# Patient Record
Sex: FEMALE | Race: WHITE | Employment: FULL TIME | URBAN - METROPOLITAN AREA
[De-identification: names, ages, dates, MRNs, and addresses within clinical notes are randomized per-mention and may not be internally consistent; named-entity substitution may affect disease eponyms.]

---

## 2023-12-04 ENCOUNTER — OFFICE VISIT (OUTPATIENT)
Dept: PRIMARY CARE CLINIC | Age: 48
End: 2023-12-04

## 2023-12-04 VITALS
WEIGHT: 190 LBS | BODY MASS INDEX: 35.87 KG/M2 | OXYGEN SATURATION: 98 % | DIASTOLIC BLOOD PRESSURE: 72 MMHG | SYSTOLIC BLOOD PRESSURE: 128 MMHG | HEIGHT: 61 IN | HEART RATE: 81 BPM

## 2023-12-04 DIAGNOSIS — Z13.220 LIPID SCREENING: ICD-10-CM

## 2023-12-04 DIAGNOSIS — Z13.21 ENCOUNTER FOR VITAMIN DEFICIENCY SCREENING: ICD-10-CM

## 2023-12-04 DIAGNOSIS — M79.601 PARESTHESIA AND PAIN OF BOTH UPPER EXTREMITIES: ICD-10-CM

## 2023-12-04 DIAGNOSIS — M54.50 CHRONIC MIDLINE LOW BACK PAIN WITHOUT SCIATICA: ICD-10-CM

## 2023-12-04 DIAGNOSIS — R20.2 PARESTHESIA OF BOTH LOWER EXTREMITIES: ICD-10-CM

## 2023-12-04 DIAGNOSIS — M79.602 PARESTHESIA AND PAIN OF BOTH UPPER EXTREMITIES: ICD-10-CM

## 2023-12-04 DIAGNOSIS — M54.6 CHRONIC BILATERAL THORACIC BACK PAIN: Primary | ICD-10-CM

## 2023-12-04 DIAGNOSIS — R20.2 PARESTHESIA AND PAIN OF BOTH UPPER EXTREMITIES: ICD-10-CM

## 2023-12-04 DIAGNOSIS — Z00.00 PERIODIC HEALTH ASSESSMENT, GENERAL SCREENING, ADULT: ICD-10-CM

## 2023-12-04 DIAGNOSIS — Z13.1 DIABETES MELLITUS SCREENING: ICD-10-CM

## 2023-12-04 DIAGNOSIS — Z13.0 SCREENING FOR DEFICIENCY ANEMIA: ICD-10-CM

## 2023-12-04 DIAGNOSIS — G89.29 CHRONIC BILATERAL THORACIC BACK PAIN: Primary | ICD-10-CM

## 2023-12-04 DIAGNOSIS — G89.29 CHRONIC MIDLINE LOW BACK PAIN WITHOUT SCIATICA: ICD-10-CM

## 2023-12-04 DIAGNOSIS — Z13.29 THYROID DISORDER SCREENING: ICD-10-CM

## 2023-12-04 PROCEDURE — 99204 OFFICE O/P NEW MOD 45 MIN: CPT | Performed by: NURSE PRACTITIONER

## 2023-12-04 RX ORDER — CYCLOBENZAPRINE HCL 5 MG
5 TABLET ORAL 3 TIMES DAILY PRN
Qty: 30 TABLET | Refills: 0 | Status: SHIPPED | OUTPATIENT
Start: 2023-12-04 | End: 2023-12-04

## 2023-12-04 RX ORDER — CYCLOBENZAPRINE HCL 5 MG
5 TABLET ORAL 3 TIMES DAILY PRN
Qty: 30 TABLET | Refills: 0 | Status: SHIPPED | OUTPATIENT
Start: 2023-12-04 | End: 2023-12-14

## 2023-12-04 RX ORDER — IBUPROFEN 800 MG/1
800 TABLET ORAL
Qty: 90 TABLET | Refills: 1 | Status: SHIPPED | OUTPATIENT
Start: 2023-12-04 | End: 2024-02-02

## 2023-12-04 RX ORDER — METHYLPREDNISOLONE 4 MG/1
TABLET ORAL
Qty: 1 KIT | Refills: 0 | Status: SHIPPED | OUTPATIENT
Start: 2023-12-04 | End: 2023-12-10

## 2023-12-04 RX ORDER — IBUPROFEN 200 MG
400 TABLET ORAL DAILY PRN
COMMUNITY
End: 2023-12-04 | Stop reason: DRUGHIGH

## 2023-12-04 RX ORDER — LIDOCAINE 50 MG/G
PATCH TOPICAL
Qty: 30 PATCH | Refills: 1 | Status: SHIPPED | OUTPATIENT
Start: 2023-12-04

## 2023-12-04 RX ORDER — IBUPROFEN 200 MG
800 TABLET ORAL EVERY 8 HOURS PRN
Status: SHIPPED | COMMUNITY
Start: 2023-12-04 | End: 2023-12-04

## 2023-12-04 NOTE — PROGRESS NOTES
12/4/2023    Chief Complaint   Patient presents with    New Patient     Session PAKX:[id number]   :  Marcelo Arriola ( Hebrew)    Back Pain    Numbness     Hand and feet at night        Kai Carrasquillo is a 49 y.o. female, presents today to establish care. The patient has concern for back pain, that radiates to right side of abdomen, and paresthesia of bilateral upper and lower extremities. Patient states she has not seen a provider since cholecystectomy over 10 years ago. Requests screening labs. HPI  Back Pain  This is a chronic problem. Episode onset: \"several years ago\" The problem occurs constantly. The problem has been gradually worsening since onset. The pain is present in the thoracic spine. Radiates to: radiates to right side of abdomen. The pain is severe. The pain is The same all the time. The symptoms are aggravated by lying down and sitting. Stiffness is present All day. Associated symptoms include abdominal pain (occassionally right abdomen), numbness, paresthesias (bilateral lower extremitites) and tingling (bilateral upper and lower extremities). Pertinent negatives include no bladder incontinence, bowel incontinence, chest pain, dysuria, headaches, pelvic pain, perianal numbness, weakness or weight loss. Risk factors include sedentary lifestyle. She has tried NSAIDs (Advil 400 mg daily as needed for pain) for the symptoms. The treatment provided mild relief. Neuropathy    It is located in the LLE, LUE, RLE and RUE region. The distribution is symmetrical. The patient experiences pain during sleep, intermittently, on awakening and in the morning. Duration: started a \"couple months ago\" Quality of pain: tingling, burning and numbing (3 fingers on right hand \"feels more numb\" that is constant). \"Opens and closes right hand to get rid of numbness\". Review of Systems   Constitutional:  Negative for weight loss. Cardiovascular:  Negative for chest pain.    Gastrointestinal:

## 2023-12-07 DIAGNOSIS — Z00.00 PERIODIC HEALTH ASSESSMENT, GENERAL SCREENING, ADULT: ICD-10-CM

## 2023-12-07 DIAGNOSIS — Z13.29 THYROID DISORDER SCREENING: ICD-10-CM

## 2023-12-07 DIAGNOSIS — Z13.220 LIPID SCREENING: ICD-10-CM

## 2023-12-07 DIAGNOSIS — Z13.21 ENCOUNTER FOR VITAMIN DEFICIENCY SCREENING: ICD-10-CM

## 2023-12-07 DIAGNOSIS — Z13.0 SCREENING FOR DEFICIENCY ANEMIA: ICD-10-CM

## 2023-12-07 DIAGNOSIS — Z13.1 DIABETES MELLITUS SCREENING: ICD-10-CM

## 2023-12-07 LAB
25(OH)D3 SERPL-MCNC: 19.4 NG/ML
ALBUMIN SERPL-MCNC: 4.2 G/DL (ref 3.4–5)
ALBUMIN/GLOB SERPL: 1.4 {RATIO} (ref 1.1–2.2)
ALP SERPL-CCNC: 90 U/L (ref 40–129)
ALT SERPL-CCNC: 10 U/L (ref 10–40)
ANION GAP SERPL CALCULATED.3IONS-SCNC: 11 MMOL/L (ref 3–16)
AST SERPL-CCNC: 15 U/L (ref 15–37)
BASOPHILS # BLD: 0.1 K/UL (ref 0–0.2)
BASOPHILS NFR BLD: 0.8 %
BILIRUB SERPL-MCNC: <0.2 MG/DL (ref 0–1)
BUN SERPL-MCNC: 10 MG/DL (ref 7–20)
CALCIUM SERPL-MCNC: 9 MG/DL (ref 8.3–10.6)
CHLORIDE SERPL-SCNC: 104 MMOL/L (ref 99–110)
CHOLEST SERPL-MCNC: 192 MG/DL (ref 0–199)
CO2 SERPL-SCNC: 28 MMOL/L (ref 21–32)
CREAT SERPL-MCNC: 0.6 MG/DL (ref 0.6–1.1)
DEPRECATED RDW RBC AUTO: 16.6 % (ref 12.4–15.4)
EOSINOPHIL # BLD: 0 K/UL (ref 0–0.6)
EOSINOPHIL NFR BLD: 0.1 %
GFR SERPLBLD CREATININE-BSD FMLA CKD-EPI: >60 ML/MIN/{1.73_M2}
GLUCOSE P FAST SERPL-MCNC: 81 MG/DL (ref 70–99)
HCT VFR BLD AUTO: 33.2 % (ref 36–48)
HDLC SERPL-MCNC: 51 MG/DL (ref 40–60)
HGB BLD-MCNC: 10.7 G/DL (ref 12–16)
LDL CHOLESTEROL CALCULATED: 126 MG/DL
LYMPHOCYTES # BLD: 2.4 K/UL (ref 1–5.1)
LYMPHOCYTES NFR BLD: 23.3 %
MCH RBC QN AUTO: 25.3 PG (ref 26–34)
MCHC RBC AUTO-ENTMCNC: 32.2 G/DL (ref 31–36)
MCV RBC AUTO: 78.7 FL (ref 80–100)
MONOCYTES # BLD: 0.6 K/UL (ref 0–1.3)
MONOCYTES NFR BLD: 6.1 %
NEUTROPHILS # BLD: 7.1 K/UL (ref 1.7–7.7)
NEUTROPHILS NFR BLD: 69.7 %
PLATELET # BLD AUTO: 325 K/UL (ref 135–450)
PMV BLD AUTO: 8.6 FL (ref 5–10.5)
POTASSIUM SERPL-SCNC: 4.1 MMOL/L (ref 3.5–5.1)
PROT SERPL-MCNC: 7.3 G/DL (ref 6.4–8.2)
RBC # BLD AUTO: 4.23 M/UL (ref 4–5.2)
SODIUM SERPL-SCNC: 143 MMOL/L (ref 136–145)
TRIGL SERPL-MCNC: 76 MG/DL (ref 0–150)
TSH SERPL DL<=0.005 MIU/L-ACNC: 2.78 UIU/ML (ref 0.27–4.2)
VLDLC SERPL CALC-MCNC: 15 MG/DL
WBC # BLD AUTO: 10.1 K/UL (ref 4–11)

## 2023-12-08 LAB
EST. AVERAGE GLUCOSE BLD GHB EST-MCNC: 128.4 MG/DL
HBA1C MFR BLD: 6.1 %

## 2024-01-08 ENCOUNTER — OFFICE VISIT (OUTPATIENT)
Dept: PRIMARY CARE CLINIC | Age: 49
End: 2024-01-08

## 2024-01-08 VITALS
DIASTOLIC BLOOD PRESSURE: 74 MMHG | BODY MASS INDEX: 34.2 KG/M2 | SYSTOLIC BLOOD PRESSURE: 122 MMHG | OXYGEN SATURATION: 98 % | HEIGHT: 63 IN | HEART RATE: 76 BPM | WEIGHT: 193 LBS

## 2024-01-08 DIAGNOSIS — D64.9 ANEMIA, UNSPECIFIED TYPE: ICD-10-CM

## 2024-01-08 DIAGNOSIS — N92.0 MENORRHAGIA WITH REGULAR CYCLE: ICD-10-CM

## 2024-01-08 DIAGNOSIS — E78.00 ELEVATED LDL CHOLESTEROL LEVEL: ICD-10-CM

## 2024-01-08 DIAGNOSIS — E66.09 CLASS 1 OBESITY DUE TO EXCESS CALORIES WITH SERIOUS COMORBIDITY AND BODY MASS INDEX (BMI) OF 34.0 TO 34.9 IN ADULT: ICD-10-CM

## 2024-01-08 DIAGNOSIS — M54.6 CHRONIC BILATERAL THORACIC BACK PAIN: Primary | ICD-10-CM

## 2024-01-08 DIAGNOSIS — R73.03 PREDIABETES: ICD-10-CM

## 2024-01-08 DIAGNOSIS — E55.9 VITAMIN D DEFICIENCY: ICD-10-CM

## 2024-01-08 DIAGNOSIS — G89.29 CHRONIC BILATERAL THORACIC BACK PAIN: Primary | ICD-10-CM

## 2024-01-08 PROCEDURE — 99214 OFFICE O/P EST MOD 30 MIN: CPT | Performed by: NURSE PRACTITIONER

## 2024-01-08 RX ORDER — ERGOCALCIFEROL 1.25 MG/1
50000 CAPSULE ORAL WEEKLY
Qty: 12 CAPSULE | Refills: 1 | Status: SHIPPED | OUTPATIENT
Start: 2024-01-08 | End: 2024-01-12 | Stop reason: SDUPTHER

## 2024-01-08 RX ORDER — LIDOCAINE 50 MG/G
PATCH TOPICAL
Qty: 30 PATCH | Refills: 2 | Status: SHIPPED | OUTPATIENT
Start: 2024-01-08 | End: 2024-01-12 | Stop reason: SDUPTHER

## 2024-01-08 RX ORDER — CYCLOBENZAPRINE HCL 5 MG
5 TABLET ORAL 3 TIMES DAILY PRN
Qty: 30 TABLET | Refills: 0 | Status: SHIPPED | OUTPATIENT
Start: 2024-01-08 | End: 2024-01-12 | Stop reason: SDUPTHER

## 2024-01-08 RX ORDER — IBUPROFEN 800 MG/1
800 TABLET ORAL 3 TIMES DAILY PRN
Qty: 90 TABLET | Refills: 2 | Status: SHIPPED | OUTPATIENT
Start: 2024-01-08 | End: 2024-04-07

## 2024-01-08 ASSESSMENT — PATIENT HEALTH QUESTIONNAIRE - PHQ9
SUM OF ALL RESPONSES TO PHQ QUESTIONS 1-9: 0
1. LITTLE INTEREST OR PLEASURE IN DOING THINGS: 0
2. FEELING DOWN, DEPRESSED OR HOPELESS: 0
SUM OF ALL RESPONSES TO PHQ QUESTIONS 1-9: 0
SUM OF ALL RESPONSES TO PHQ9 QUESTIONS 1 & 2: 0

## 2024-01-08 ASSESSMENT — ENCOUNTER SYMPTOMS
BOWEL INCONTINENCE: 0
GASTROINTESTINAL NEGATIVE: 1

## 2024-01-08 NOTE — PROGRESS NOTES
1/8/2024    Chief Complaint   Patient presents with    1 Month Follow-Up     4 week follow up for back pain, paresthesia, and review labs    Session Code:70808 (Indonesian )       Zahra Gallegos is a 48 y.o. female, presents today 1 month follow up of back pain and review lab results.     utilized throughout visit.  Patient is accompanied by her friend, Etelvina today.      HPI  Back Pain  This is a chronic problem. Episode onset: \"several years ago\" The problem occurs constantly. The problem has been gradually worsening since onset. The pain is present in the thoracic spine. Radiates to: radiates to right side of abdomen. The pain is severe. The pain is The same all the time. The symptoms are aggravated by lying down and sitting. Stiffness is present All day. Pertinent negatives include no bladder incontinence, bowel incontinence, chest pain, dysuria, headaches, leg pain, numbness, paresthesias, pelvic pain, perianal numbness, tingling, weakness or weight loss. Risk factors include sedentary lifestyle. She has tried NSAIDs (medrol dosepack, muscle relaxers, lidocaine patches) for the symptoms. The treatment provided moderate relief.     Requests refill of lidocaine patches and muscle relaxer.    Patient to schedule an appointment with Kinga Laws PA-C with [facility].       Neuropathy  It is located in the LLE, LUE, RLE and RUE region. The distribution is symmetrical. The patient experiences pain during sleep, intermittently, on awakening and in the morning. Duration: started a \"couple months ago\" Quality of pain: tingling, burning and numbing (3 fingers on right hand \"feels more numb\" that is constant).   \"Opens and closes right hand to get rid of numbness\".      We discussed EMG procedures, however patient is unable to afford due to lack of insurance.  Will continue to monitor        Review of lab results  The available labs reviewed and analyzed and independent interpretation of the

## 2024-01-09 PROBLEM — E55.9 VITAMIN D DEFICIENCY: Status: ACTIVE | Noted: 2024-01-09

## 2024-01-09 PROBLEM — D64.9 ANEMIA: Status: ACTIVE | Noted: 2024-01-09

## 2024-01-09 PROBLEM — N92.0 MENORRHAGIA WITH REGULAR CYCLE: Status: ACTIVE | Noted: 2024-01-09

## 2024-01-09 PROBLEM — G89.29 CHRONIC BILATERAL THORACIC BACK PAIN: Status: ACTIVE | Noted: 2024-01-09

## 2024-01-09 PROBLEM — E78.00 ELEVATED LDL CHOLESTEROL LEVEL: Status: ACTIVE | Noted: 2024-01-09

## 2024-01-09 PROBLEM — R73.03 PREDIABETES: Status: ACTIVE | Noted: 2024-01-09

## 2024-01-09 PROBLEM — E66.09 CLASS 1 OBESITY DUE TO EXCESS CALORIES WITH SERIOUS COMORBIDITY AND BODY MASS INDEX (BMI) OF 34.0 TO 34.9 IN ADULT: Status: ACTIVE | Noted: 2024-01-09

## 2024-01-09 PROBLEM — M54.6 CHRONIC BILATERAL THORACIC BACK PAIN: Status: ACTIVE | Noted: 2024-01-09

## 2024-01-09 ASSESSMENT — ENCOUNTER SYMPTOMS
WHEEZING: 0
SHORTNESS OF BREATH: 0
CHEST TIGHTNESS: 0
BACK PAIN: 1

## 2024-01-12 DIAGNOSIS — E55.9 VITAMIN D DEFICIENCY: ICD-10-CM

## 2024-01-12 DIAGNOSIS — M54.50 CHRONIC MIDLINE LOW BACK PAIN WITHOUT SCIATICA: ICD-10-CM

## 2024-01-12 DIAGNOSIS — M54.6 CHRONIC BILATERAL THORACIC BACK PAIN: ICD-10-CM

## 2024-01-12 DIAGNOSIS — G89.29 CHRONIC MIDLINE LOW BACK PAIN WITHOUT SCIATICA: ICD-10-CM

## 2024-01-12 DIAGNOSIS — G89.29 CHRONIC BILATERAL THORACIC BACK PAIN: ICD-10-CM

## 2024-01-12 RX ORDER — CYCLOBENZAPRINE HCL 5 MG
5 TABLET ORAL 3 TIMES DAILY PRN
Qty: 30 TABLET | Refills: 0 | Status: SHIPPED | OUTPATIENT
Start: 2024-01-12 | End: 2024-01-22

## 2024-01-12 RX ORDER — IBUPROFEN 800 MG/1
800 TABLET ORAL
Qty: 90 TABLET | Refills: 1 | Status: SHIPPED | OUTPATIENT
Start: 2024-01-12 | End: 2024-03-12

## 2024-01-12 RX ORDER — ERGOCALCIFEROL 1.25 MG/1
50000 CAPSULE ORAL WEEKLY
Qty: 12 CAPSULE | Refills: 3 | Status: SHIPPED | OUTPATIENT
Start: 2024-01-12 | End: 2025-01-11

## 2024-01-12 RX ORDER — LIDOCAINE 50 MG/G
PATCH TOPICAL
Qty: 30 PATCH | Refills: 2 | Status: SHIPPED | OUTPATIENT
Start: 2024-01-12

## 2024-01-12 NOTE — PROGRESS NOTES
1. Chronic bilateral thoracic back pain  - cyclobenzaprine (FLEXERIL) 5 MG tablet; Take 1 tablet by mouth 3 times daily as needed for Muscle spasms -- Use caution after taking -- may cause dizziness or drowsiness  Dispense: 30 tablet; Refill: 0  - ibuprofen (ADVIL;MOTRIN) 800 MG tablet; Take 1 tablet by mouth 3 times daily (with meals)  Dispense: 90 tablet; Refill: 1  - lidocaine (LIDODERM) 5 %; Place 1 patch to middle of back and lower back every 12 hours on, and 12 hours off.  Dispense: 30 patch; Refill: 2    2. Chronic midline low back pain without sciatica  - ibuprofen (ADVIL;MOTRIN) 800 MG tablet; Take 1 tablet by mouth 3 times daily (with meals)  Dispense: 90 tablet; Refill: 1    3. Vitamin D deficiency  - vitamin D (ERGOCALCIFEROL) 1.25 MG (70620 UT) CAPS capsule; Take 1 capsule by mouth once a week  Dispense: 12 capsule; Refill: 3       PRESCRIPTIONS PRINTED to use GoodRx at different pharmacies.

## 2024-02-08 ENCOUNTER — OFFICE VISIT (OUTPATIENT)
Dept: ORTHOPEDIC SURGERY | Age: 49
End: 2024-02-08

## 2024-02-08 ENCOUNTER — NURSE ONLY (OUTPATIENT)
Dept: PRIMARY CARE CLINIC | Age: 49
End: 2024-02-08

## 2024-02-08 ENCOUNTER — TELEPHONE (OUTPATIENT)
Dept: PRIMARY CARE CLINIC | Age: 49
End: 2024-02-08

## 2024-02-08 VITALS — RESPIRATION RATE: 16 BRPM | HEIGHT: 63 IN | BODY MASS INDEX: 32.96 KG/M2 | WEIGHT: 186 LBS

## 2024-02-08 DIAGNOSIS — D64.9 ANEMIA, UNSPECIFIED TYPE: ICD-10-CM

## 2024-02-08 DIAGNOSIS — M54.6 THORACIC SPINE PAIN: Primary | ICD-10-CM

## 2024-02-08 DIAGNOSIS — M54.14 THORACIC RADICULOPATHY: ICD-10-CM

## 2024-02-08 DIAGNOSIS — N92.0 MENORRHAGIA WITH REGULAR CYCLE: ICD-10-CM

## 2024-02-08 LAB
CONTROL: NON REACTIVE
FECAL BLOOD IMMUNOCHEMICAL TEST: NON REACTIVE

## 2024-02-08 PROCEDURE — 99203 OFFICE O/P NEW LOW 30 MIN: CPT | Performed by: STUDENT IN AN ORGANIZED HEALTH CARE EDUCATION/TRAINING PROGRAM

## 2024-02-08 NOTE — TELEPHONE ENCOUNTER
----- Message from GOPAL Gordon CNP sent at 2/8/2024 12:08 PM EST -----  Please notify patient fit test was negative.  Thank you

## 2024-02-08 NOTE — PROGRESS NOTES
Tests:      Spurling's, L'Hermitte's & Rich's negative bilaterally.     Garcia and Impingement tests are negative bilaterally.     Cubital and Carpal tunnel Tinel's negative bilaterally.      Skin:There are no rashes, ulcerations or lesions in right & left upper extremities.  Reflexes: Bilaterally triceps, biceps and brachioradialis are 2+.  Clonus absent bilaterally at the feet.    Additional Examinations:       RIGHT UPPER EXTREMITY:  Inspection/examination of the right upper extremity does not show any tenderness, deformity or injury. Range of motion is full. There is no gross instability.  There are no rashes, ulcerations or lesions. Strength and tone are normal.  LEFT UPPER EXTREMITY: Inspection/examination of the left upper extremity does not show any tenderness, deformity or injury. Range of motion is full. There is no gross instability.  There are no rashes, ulcerations or lesions. Strength and tone are normal.      Diagnostic Testing:    Reviewed AP and lateral thoracic spine films taken today in the office: No acute abnormality or fracture noted.  Normal height and configuration of vertebral bodies.  No significant kyphosis.       Impression:    Diagnosis Orders   1. Thoracic spine pain  [facility] (2 VIEWS)      2. Thoracic radiculopathy  [facility]            Plan:    Above diagnoses are Worsening    1. Medications: Continue anti-inflammatories with appropriate GI Precautions including to stop if develop dark tarry stools or GI upset and to take with food.    2. PT:  I will start the patient on a trial of PT to work on a thoracic stabilization program to focus on stretching, strengthening, traction and modalities as indicated.    3. Further studies:  We can consider an MRI of the thoracic spine if no improvement with a trial of physical therapy.    4. Interventional:   She may be a candidate for interventions in the future. She does not have insurance which

## 2024-02-09 LAB
FERRITIN SERPL IA-MCNC: 9.1 NG/ML (ref 15–150)
IRON SATN MFR SERPL: 7 % (ref 15–50)
IRON SERPL-MCNC: 32 UG/DL (ref 37–145)
TIBC SERPL-MCNC: 454 UG/DL (ref 260–445)

## 2024-02-20 ENCOUNTER — HOSPITAL ENCOUNTER (OUTPATIENT)
Dept: PHYSICAL THERAPY | Age: 49
Setting detail: THERAPIES SERIES
Discharge: HOME | End: 2024-02-20

## 2024-02-20 DIAGNOSIS — M25.651 IMPAIRED RANGE OF MOTION OF BOTH HIPS: ICD-10-CM

## 2024-02-20 DIAGNOSIS — M53.86 DECREASED RANGE OF MOTION OF LUMBAR SPINE: Primary | ICD-10-CM

## 2024-02-20 DIAGNOSIS — M25.652 IMPAIRED RANGE OF MOTION OF BOTH HIPS: ICD-10-CM

## 2024-02-20 PROCEDURE — 97161 PT EVAL LOW COMPLEX 20 MIN: CPT

## 2024-02-20 PROCEDURE — 97110 THERAPEUTIC EXERCISES: CPT

## 2024-02-20 NOTE — PLAN OF CARE
[facility] - Outpatient Rehabilitation and Therapy [address], [place] [address] office: [phone] fax: [phone]     Physical Therapy Initial Evaluation Certification      Dear Kinga Laws PA,    We had the pleasure of evaluating the following patient for physical therapy services at [facility].  A summary of our findings can be found in the initial assessment below.  This includes our plan of care.  If you have any questions or concerns regarding these findings, please do not hesitate to contact me at the office phone number listed above.  Thank you for the referral.     Physician Signature:_______________________________Date:__________________  By signing above (or electronic signature), therapist’s plan is approved by physician       Physical Therapy: TREATMENT/PROGRESS NOTE   Patient: Zahra Gallegos (48 y.o. female)   Examination Date: 2024   :  1975 MRN: [id number]   Visit #:   Insurance Allowable Auth Needed   N/A  Self-Pay []Yes    [x]No    Insurance: Payor: /   Insurance ID: No Subscriber Number on File  Secondary Insurance (if applicable):     *SELF-PAY*   Treatment Diagnosis:     ICD-10-CM    1. Decreased range of motion of lumbar spine  M53.86       2. Impaired range of motion of both hips  M25.651     M25.652          Medical Diagnosis:  Thoracic radiculopathy [M54.14]   Referring Physician: Kinga Laws PA  PCP: Di Romero APRN - CNP       Plan of care signed (Y/N):     Date of Patient follow up with Physician: 24     Progress Report/POC: EVAL today  POC update due: (10 visits /OR AUTH LIMITS, whichever is less)  3/21/2024                                             Precautions/ Contra-indications:           Latex allergy:  NO  Pacemaker:    NO  Contraindications for Manipulation: None  Date of Surgery: n/a  Other:    Red Flags:  None    C-SSRS Triggered by Intake questionnaire:   [x] No,

## 2024-03-01 ENCOUNTER — HOSPITAL ENCOUNTER (OUTPATIENT)
Dept: PHYSICAL THERAPY | Age: 49
Setting detail: THERAPIES SERIES
Discharge: HOME | End: 2024-03-01

## 2024-03-01 PROCEDURE — 97110 THERAPEUTIC EXERCISES: CPT

## 2024-03-01 PROCEDURE — 97530 THERAPEUTIC ACTIVITIES: CPT

## 2024-03-01 NOTE — FLOWSHEET NOTE
and Grade V Manipulation  [x] Modalities as needed that may include: Thermal Agents  [x] Patient education on joint protection, postural re-education, activity modification, progression of HEP.        [] Aquatic Therapy    Plan: Cont POC- Continue emphasis/focus on exercise progression, improving proper muscle recruitment and activation/motor control patterns, modulating pain, and improving postural awareness. Next visit plan to add new exercises, adjust HEP, and continue with squat form and hip hinge to protect lumbar spine      Electronically Signed by Mathew Kerr, PT DPT [facility] Date: 03/01/2024    Note: Portions of this note have been templated and/or copied from initial evaluation, reassessments and prior notes for documentation efficiency.

## 2024-03-08 ENCOUNTER — HOSPITAL ENCOUNTER (OUTPATIENT)
Dept: PHYSICAL THERAPY | Age: 49
Setting detail: THERAPIES SERIES
Discharge: HOME | End: 2024-03-08

## 2024-03-08 PROCEDURE — 97110 THERAPEUTIC EXERCISES: CPT

## 2024-03-08 PROCEDURE — 97530 THERAPEUTIC ACTIVITIES: CPT

## 2024-03-08 NOTE — FLOWSHEET NOTE
up grandchild        Hip hinge - standing  Hip hinge - sitting    3/1: Max verbal and visual cues           Floor transfers on large mat table -    X 2 trials 3/8: practicing with verbal and tactile cues for sequencing                           Modalities:    No modalities applied this session    Education/Home Exercise Program: Patient HEP program created electronically.  Refer to [employer] access code: [email or web address]    Access Code: [email or web address]  URL: [email or web address]  Date: 02/20/2024  Prepared by: Usha Carmona    Exercises  - Supine Lower Trunk Rotation  - 2 x daily - 7 x weekly - 1 sets - 10 reps  - Sidelying Open Book Thoracic Rotation with Knee on Foam Roll  - 2 x daily - 7 x weekly - 1 sets - 10 reps - 5-10 seconds hold  - Prone Press Up On Elbows  - 2 x daily - 7 x weekly - 1 sets - 10 reps  - Supine Piriformis Stretch with Foot on Ground  - 2 x daily - 7 x weekly - 1 sets - 3 reps - 20 seconds hold  - Latissimus Dorsi Stretch at Wall  - 2 x daily - 7 x weekly - 1 sets - 5 reps - 5 seconds hold  - Wall [place]  - 2 x daily - 7 x weekly - 1 sets - 10 reps    Also discussed beginning in small ROM and then progressing as tolerable and using heating pad     Access Code: Same as above  Date: 03/01/2024  Prepared by: Usha Mathew    Exercises  - Wall Push Up  - 1 x daily - 7 x weekly - 2 sets - 10 reps  - Standing Hip Flexion AROM  - 1 x daily - 7 x weekly - 2 sets - 10 reps  - Standing Hip Abduction AROM  - 1 x daily - 7 x weekly - 2 sets - 10 reps  - Standing Hip Extension with Chair  - 1 x daily - 7 x weekly - 2 sets - 10 reps  - Alternating Shoulder Flexion at Wall with Foam Roller  - 1 x daily - 7 x weekly - 2 sets - 10 reps  - Seated Hip Hinge with Dowel  - 1 x daily - 7 x weekly - 2 sets - 10 reps      ASSESSMENT     Today's Assessment:  Revisited squat mechanics with pt. Pt demo'd much improved form compared to last week. Also practiced and discussed floor transfers as pt reported she had a

## 2024-03-22 ENCOUNTER — APPOINTMENT (OUTPATIENT)
Dept: PHYSICAL THERAPY | Age: 49
End: 2024-03-22

## 2024-03-29 ENCOUNTER — HOSPITAL ENCOUNTER (OUTPATIENT)
Dept: PHYSICAL THERAPY | Age: 49
Setting detail: THERAPIES SERIES
Discharge: HOME | End: 2024-03-29

## 2024-03-29 PROCEDURE — 97530 THERAPEUTIC ACTIVITIES: CPT

## 2024-03-29 PROCEDURE — 97110 THERAPEUTIC EXERCISES: CPT

## 2024-03-29 PROCEDURE — 97140 MANUAL THERAPY 1/> REGIONS: CPT

## 2024-03-29 NOTE — PLAN OF CARE
functional deficits.   Status: [] Progressing: [] Met: [x] Not Met: [] Adjusted    Long Term Goals: To be achieved in: 4 weeks  Disability index score of 20% or less for the Modified Oswestry to assist with return top prior level of function.   Status: [x] Progressing: [] Met: [] Not Met: [] Adjusted  Improve lumbar AROM to 60 degrees or  better to allow for proper joint functioning for pt to be able to bend over and  her granddaughter.   Status: [x] Progressing: [] Met: [] Not Met: [] Adjusted  Pt to demo ability to reach overhead with B UEs and reach for items on high shelves for at least 10 reps in a row without increase in pain to be able to complete household chores.     Status: [x] Progressing: [] Met: [] Not Met: [] Adjusted  Patient will return to  squatting  with good form and pain <2/10 with a crate lift of at least 25# to be able to  her granddaughter with less difficulty.         Status: [x] Progressing: [] Met: [] Not Met: [] Adjusted    TREATMENT PLAN     Frequency/Duration:  1 /week for 4 weeks for the following treatment interventions:, Continuing with additional 4 V of PT    Interventions:  [x] Therapeutic exercise including: strength training, ROM, including postural re-education.   [x] NMR activation and proprioception, including postural re-education.    [x] Manual therapy as indicated to include: PROM, Gr I-IV mobilizations, STM, and Grade V Manipulation  [x] Modalities as needed that may include: Thermal Agents  [x] Patient education on joint protection, postural re-education, activity modification, progression of HEP.        [] Aquatic Therapy    Plan: Cont POC- Continue emphasis/focus on exercise progression, improving proper muscle recruitment and activation/motor control patterns, modulating pain, and improving postural awareness. Next visit plan to add new exercises, adjust HEP, and continue with core and hip strengthening         Electronically Signed by Mathew Kerr, PT

## 2024-04-11 ENCOUNTER — OFFICE VISIT (OUTPATIENT)
Dept: PRIMARY CARE CLINIC | Age: 49
End: 2024-04-11

## 2024-04-11 VITALS
HEART RATE: 76 BPM | HEIGHT: 63 IN | BODY MASS INDEX: 32.6 KG/M2 | TEMPERATURE: 97.1 F | OXYGEN SATURATION: 95 % | WEIGHT: 184 LBS | DIASTOLIC BLOOD PRESSURE: 76 MMHG | SYSTOLIC BLOOD PRESSURE: 120 MMHG

## 2024-04-11 DIAGNOSIS — E78.00 ELEVATED LDL CHOLESTEROL LEVEL: ICD-10-CM

## 2024-04-11 DIAGNOSIS — D50.0 IRON DEFICIENCY ANEMIA DUE TO CHRONIC BLOOD LOSS: Primary | ICD-10-CM

## 2024-04-11 DIAGNOSIS — N92.0 MENORRHAGIA WITH REGULAR CYCLE: ICD-10-CM

## 2024-04-11 DIAGNOSIS — R20.2 PARESTHESIA AND PAIN OF BOTH UPPER EXTREMITIES: ICD-10-CM

## 2024-04-11 DIAGNOSIS — E55.9 VITAMIN D DEFICIENCY: ICD-10-CM

## 2024-04-11 DIAGNOSIS — R20.2 PARESTHESIA OF RIGHT FOOT: ICD-10-CM

## 2024-04-11 DIAGNOSIS — H53.9 VISION CHANGES: ICD-10-CM

## 2024-04-11 DIAGNOSIS — M79.601 PARESTHESIA AND PAIN OF BOTH UPPER EXTREMITIES: ICD-10-CM

## 2024-04-11 DIAGNOSIS — M79.602 PARESTHESIA AND PAIN OF BOTH UPPER EXTREMITIES: ICD-10-CM

## 2024-04-11 DIAGNOSIS — M54.6 CHRONIC BILATERAL THORACIC BACK PAIN: ICD-10-CM

## 2024-04-11 DIAGNOSIS — R73.03 PREDIABETES: ICD-10-CM

## 2024-04-11 DIAGNOSIS — G89.29 CHRONIC BILATERAL THORACIC BACK PAIN: ICD-10-CM

## 2024-04-11 DIAGNOSIS — E66.09 CLASS 1 OBESITY DUE TO EXCESS CALORIES WITHOUT SERIOUS COMORBIDITY WITH BODY MASS INDEX (BMI) OF 32.0 TO 32.9 IN ADULT: ICD-10-CM

## 2024-04-11 PROCEDURE — 99214 OFFICE O/P EST MOD 30 MIN: CPT | Performed by: NURSE PRACTITIONER

## 2024-04-11 RX ORDER — FERROUS SULFATE 325(65) MG
325 TABLET ORAL 2 TIMES DAILY
Qty: 180 TABLET | Refills: 0 | Status: SHIPPED | OUTPATIENT
Start: 2024-04-11 | End: 2024-04-11

## 2024-04-11 RX ORDER — FERROUS SULFATE 325(65) MG
325 TABLET ORAL 2 TIMES DAILY
Qty: 180 TABLET | Refills: 1 | Status: SHIPPED | OUTPATIENT
Start: 2024-04-11 | End: 2024-10-08

## 2024-04-11 ASSESSMENT — ENCOUNTER SYMPTOMS
GASTROINTESTINAL NEGATIVE: 1
BOWEL INCONTINENCE: 0
CHEST TIGHTNESS: 0
SHORTNESS OF BREATH: 0
BACK PAIN: 1
WHEEZING: 0

## 2024-04-11 NOTE — PROGRESS NOTES
reported pain).      Lumbar back: No swelling, edema, deformity, signs of trauma, lacerations, spasms, tenderness or bony tenderness. Normal range of motion.        Back:       Right lower leg: No edema.      Left lower leg: No edema.   Lymphadenopathy:      Cervical: No cervical adenopathy.   Skin:     General: Skin is warm.   Neurological:      General: No focal deficit present.      Mental Status: She is alert and oriented to person, place, and time.   Psychiatric:         Mood and Affect: Mood normal.         Behavior: Behavior normal.         Thought Content: Thought content normal.         ASSESSMENT/PLAN:  1. Vitamin D deficiency  -Diagnosed 1/8/24 (19.4)  -Vitamin D level 12/7/2023--> 19.4  -Continue vitamin D 50,000 units weekly  - Vitamin D 25 Hydroxy; Future    2. Iron deficiency anemia due to chronic blood loss  -Active  -Heavy menstrual period lasting 7 days  - CBC with Auto Differential; Future  - Iron and TIBC; Future  - Ferritin; Future  -Start ferrous sulfate (IRON 325) 325 (65 Fe) MG tablet; Take 1 tablet by mouth 2 times daily  Dispense: 180 tablet; Refill: 1    3. Menorrhagia with regular cycle  -Active  -Heavy menstrual period lasting 7 days  - CBC with Auto Differential; Future  - Iron and TIBC; Future  - Ferritin; Future  -Start ferrous sulfate (IRON 325) 325 (65 Fe) MG tablet; Take 1 tablet by mouth 2 times daily  Dispense: 180 tablet; Refill: 1    4. Elevated LDL cholesterol level  - New dx 1/8/24  -Lipid panel 12/7/2023--> 126  - The 10-year ASCVD risk score (Yodit DK, et al., 2019) is: 1% -- statin not indicated.   -Continue low saturated fat diet, regular exercise and work towards weight loss to lower cardiovascular risk and LDL.  - Lipid, Fasting; Future    5. Prediabetes  -Diagnosed 1/8/24  -Hemoglobin A1c 12/7/2023--> 6.1%  -Continue limiting rice, potatoes, bread, pasta concentrated sweets  -Work towards weight loss and start regular exercise to prevent development of diabetes.  -

## 2024-04-19 ENCOUNTER — HOSPITAL ENCOUNTER (OUTPATIENT)
Dept: PHYSICAL THERAPY | Age: 49
Setting detail: THERAPIES SERIES
Discharge: HOME | End: 2024-04-19

## 2024-04-19 PROCEDURE — 97140 MANUAL THERAPY 1/> REGIONS: CPT

## 2024-04-19 PROCEDURE — 97110 THERAPEUTIC EXERCISES: CPT

## 2024-04-19 NOTE — FLOWSHEET NOTE
[facility] - Outpatient Rehabilitation and Therapy [address], [place] [address] office: [phone] fax: [phone]         Physical Therapy: TREATMENT/PROGRESS NOTE   Patient: Zahra Gallegos (48 y.o. female)   Examination Date: 2024   :  1975 MRN: [id number]   Visit #:   Insurance Allowable Auth Needed   N/A  Self-Pay []Yes    [x]No    Insurance: Payor: /   Insurance ID: No Subscriber Number on File  Secondary Insurance (if applicable):     *SELF-PAY*   Treatment Diagnosis:     ICD-10-CM    1. Decreased range of motion of lumbar spine  M53.86       2. Impaired range of motion of both hips  M25.651     M25.652          Medical Diagnosis:  Thoracic radiculopathy [M54.14]   Referring Physician: Kinga Laws PA  PCP: Di Romero APRN - CNP       Plan of care signed (Y/N): Y    Date of Patient follow up with Physician: 24     Progress Report/POC: NO  POC update due: (10 visits /OR AUTH LIMITS, whichever is less)  2024                                             Precautions/ Contra-indications:           Latex allergy:  NO  Pacemaker:    NO  Contraindications for Manipulation: None  Date of Surgery: n/a  Other:    Red Flags:  None    C-SSRS Triggered by Intake questionnaire:   [x] No, Questionnaire did not trigger screening.   [] Yes, Patient intake triggered further evaluation      [] C-SSRS Screening completed  [] PCP notified via Plan of Care  [] Emergency services notified     Preferred Language for Healthcare:   [] English       [x] other:  required    SUBJECTIVE EXAMINATION     Patient stated complaint:  Pt reports right now she has been going to the field and walking for about 45 minutes daily, Monday-Friday. Does report she is able to hold her granddaughter for longer now. Her daughter also has been massaging her Rib area and that seems to be helping now.      Test used Initial score  2024   Pain Summary VAS At

## 2024-04-26 ENCOUNTER — HOSPITAL ENCOUNTER (OUTPATIENT)
Dept: PHYSICAL THERAPY | Age: 49
Setting detail: THERAPIES SERIES
Discharge: HOME | End: 2024-04-26

## 2024-04-26 PROCEDURE — 97140 MANUAL THERAPY 1/> REGIONS: CPT

## 2024-04-26 PROCEDURE — 97110 THERAPEUTIC EXERCISES: CPT

## 2024-04-26 NOTE — FLOWSHEET NOTE
[facility] - Outpatient Rehabilitation and Therapy [address], [place] [address] office: [phone] fax: [phone]         Physical Therapy: TREATMENT/PROGRESS NOTE   Patient: Zahra Gallegos (48 y.o. female)   Examination Date: 2024   :  1975 MRN: [id number]   Visit #: 6   Insurance Allowable Auth Needed   N/A  Self-Pay []Yes    [x]No    Insurance: Payor: /   Insurance ID: No Subscriber Number on File  Secondary Insurance (if applicable):     *SELF-PAY*   Treatment Diagnosis:     ICD-10-CM    1. Decreased range of motion of lumbar spine  M53.86       2. Impaired range of motion of both hips  M25.651     M25.652          Medical Diagnosis:  Thoracic radiculopathy [M54.14]   Referring Physician: Kinga Laws PA  PCP: Di Romero APRN - CNP       Plan of care signed (Y/N): Y    Date of Patient follow up with Physician: 24     Progress Report/POC: NO  POC update due: (10 visits /OR AUTH LIMITS, whichever is less)  2024                                             Precautions/ Contra-indications:           Latex allergy:  NO  Pacemaker:    NO  Contraindications for Manipulation: None  Date of Surgery: n/a  Other:    Red Flags:  None    C-SSRS Triggered by Intake questionnaire:   [x] No, Questionnaire did not trigger screening.   [] Yes, Patient intake triggered further evaluation      [] C-SSRS Screening completed  [] PCP notified via Plan of Care  [] Emergency services notified     Preferred Language for Healthcare:   [] English       [x] other:  required    SUBJECTIVE EXAMINATION     Patient stated complaint:  Pt reports she is not feeling very good today. The pain started yesterday when she was sitting on a piece of wood and watching her children plant. She was sitting for about 1 hour without back support and then when she stood up she couldn't bend forward or backward.      Test used Initial score  2024   Pain Summary

## 2024-04-29 ENCOUNTER — TELEPHONE (OUTPATIENT)
Dept: PRIMARY CARE CLINIC | Age: 49
End: 2024-04-29

## 2024-05-10 ENCOUNTER — HOSPITAL ENCOUNTER (OUTPATIENT)
Dept: PHYSICAL THERAPY | Age: 49
Setting detail: THERAPIES SERIES
Discharge: HOME | End: 2024-05-10

## 2024-05-10 PROCEDURE — 97110 THERAPEUTIC EXERCISES: CPT

## 2024-05-10 NOTE — PLAN OF CARE
[facility] - Outpatient Rehabilitation and Therapy [address], [place] [address] office: [phone] fax: [phone]  Physical Therapy Re-Certification Plan of Care    Dear Kinga Laws PA  ,    We had the pleasure of treating the following patient for physical therapy services at [facility]. A summary of our findings can be found in the updated assessment below.  This includes our plan of care.  If you have any questions or concerns regarding these findings, please do not hesitate to contact me at the office phone number checked above.  Thank you for the referral.     Physician Signature:________________________________Date:__________________  By signing above (or electronic signature), therapist's plan is approved by physician      Functional Outcome: TEO  = 68% dysfunction       5/10/24  ROM/Strength: (Blank cells denote NT) Improvements noted in bold     Mvmt (norm) AROM L AROM R Notes PROM L PROM R Notes               LUMBAR Flex (90) 40        Ext (25) 10 Feels like a nail or needle in her low back with extension       SB (25) 10 10 L SB more painful because of the pulling on the R        Rotation (30)   NT due to pain levels             MMT L MMT R Notes       HIP Flexion 5  5 5  5 Seated  Supine     Abduction 5 5 Sidelying ;  R painful           KNEE Flexion 5 5     Extension 5 5        Overall Response to Treatment:  Patient has been seen for a total of 7 visits and had been progressing well. As she left the most recent visit she stated it was the \"best she has felt since starting PT\". However, recently had a set back with a likely mm spasm in her back. Less likely that patient herniated a disc based on her signs and symptoms. She did demo a general decrease in AROM at the lumbar spine (except flexion was back to baseline eval ROM) and an increase in dysfunction based on her TEO score. Will benefit from continued PT to improve pt pain levels

## 2024-05-24 ENCOUNTER — APPOINTMENT (OUTPATIENT)
Dept: PHYSICAL THERAPY | Age: 49
End: 2024-05-24

## 2024-05-25 ENCOUNTER — HOSPITAL ENCOUNTER (OUTPATIENT)
Dept: PHYSICAL THERAPY | Age: 49
Setting detail: THERAPIES SERIES
End: 2024-05-25

## 2024-05-25 ENCOUNTER — APPOINTMENT (OUTPATIENT)
Dept: PHYSICAL THERAPY | Age: 49
End: 2024-05-25

## 2024-06-08 ENCOUNTER — HOSPITAL ENCOUNTER (OUTPATIENT)
Dept: PHYSICAL THERAPY | Age: 49
Setting detail: THERAPIES SERIES
Discharge: HOME | End: 2024-06-08

## 2024-06-08 PROCEDURE — 97110 THERAPEUTIC EXERCISES: CPT

## 2024-06-08 PROCEDURE — 97530 THERAPEUTIC ACTIVITIES: CPT

## 2024-06-08 NOTE — FLOWSHEET NOTE
AROM  - 1 x daily - 7 x weekly - 2 sets - 10 reps  - Standing Hip Extension with Chair  - 1 x daily - 7 x weekly - 2 sets - 10 reps  - Alternating Shoulder Flexion at Wall with Foam Roller  - 1 x daily - 7 x weekly - 2 sets - 10 reps  - Seated Hip Hinge with Dowel  - 1 x daily - 7 x weekly - 2 sets - 10 reps    Access Code: [email or web address]  URL: [email or web address]  Date: 04/19/2024  Prepared by: Usha Carmona    Exercises  - Quadruped Alternating Leg Extensions  - 2-3 x weekly - 1 sets - 10 reps  - Bird Dog  - 2-3 x weekly - 1 sets - 10 reps  - Child's Pose Stretch  - 2-3 x weekly - 3 reps - 20 seconds hold    5/10: Pt education: Lengthy discussions had regarding muscle spasms vs disc herniation (including sins and symptoms of each); heat vs cold and effects on the body; as well as progress made so far including regression due to recent mm spasm and plan for continuing PT. Also discussed with pt that we cannot provide her with a work not for the last 2 weeks, we can only provide proof of presence for the days that she has PT sessions. Informed pt she will need to contact MD if she needs any other documentation filled out for work. Pt verbalized understanding of all education/discussed tops this date.       ASSESSMENT     Today's Assessment:  Continued with core strengthening with neutral spine this date. Pt tolerated all exercises well. Pain has improved significantly overall despite set back when she had to visit the ED. Will plan to DC next session.     Medical Necessity Documentation:  I certify that this patient meets the below criteria necessary for medical necessity for care and/or justification of therapy services:  The patient has a musculoskeletal condition(s) with a corresponding ICD-10 code that is of complexity and severity that require skilled therapeutic intervention. This has a direct and significant impact on the need for therapy and significantly impacts the rate of recovery.     Return to

## 2024-06-11 DIAGNOSIS — N92.0 MENORRHAGIA WITH REGULAR CYCLE: ICD-10-CM

## 2024-06-11 DIAGNOSIS — E55.9 VITAMIN D DEFICIENCY: ICD-10-CM

## 2024-06-11 DIAGNOSIS — D50.0 IRON DEFICIENCY ANEMIA DUE TO CHRONIC BLOOD LOSS: ICD-10-CM

## 2024-06-11 DIAGNOSIS — R73.03 PREDIABETES: ICD-10-CM

## 2024-06-11 DIAGNOSIS — E78.00 ELEVATED LDL CHOLESTEROL LEVEL: ICD-10-CM

## 2024-06-11 LAB
25(OH)D3 SERPL-MCNC: 28.4 NG/ML
BASOPHILS # BLD: 0.1 K/UL (ref 0–0.2)
BASOPHILS NFR BLD: 1.3 %
DEPRECATED RDW RBC AUTO: 18.4 % (ref 12.4–15.4)
EOSINOPHIL # BLD: 0.1 K/UL (ref 0–0.6)
EOSINOPHIL NFR BLD: 1.1 %
HCT VFR BLD AUTO: 33.5 % (ref 36–48)
HGB BLD-MCNC: 10.8 G/DL (ref 12–16)
LYMPHOCYTES # BLD: 2.2 K/UL (ref 1–5.1)
LYMPHOCYTES NFR BLD: 36.2 %
MCH RBC QN AUTO: 26 PG (ref 26–34)
MCHC RBC AUTO-ENTMCNC: 32.3 G/DL (ref 31–36)
MCV RBC AUTO: 80.4 FL (ref 80–100)
MONOCYTES # BLD: 0.4 K/UL (ref 0–1.3)
MONOCYTES NFR BLD: 7.1 %
NEUTROPHILS # BLD: 3.2 K/UL (ref 1.7–7.7)
NEUTROPHILS NFR BLD: 54.3 %
PLATELET # BLD AUTO: 340 K/UL (ref 135–450)
PMV BLD AUTO: 8.1 FL (ref 5–10.5)
RBC # BLD AUTO: 4.16 M/UL (ref 4–5.2)
WBC # BLD AUTO: 6 K/UL (ref 4–11)

## 2024-06-12 LAB
CHOLEST SERPL-MCNC: 191 MG/DL (ref 0–199)
EST. AVERAGE GLUCOSE BLD GHB EST-MCNC: 119.8 MG/DL
FERRITIN SERPL IA-MCNC: 24.5 NG/ML (ref 15–150)
HBA1C MFR BLD: 5.8 %
HDLC SERPL-MCNC: 50 MG/DL (ref 40–60)
IRON SATN MFR SERPL: 65 % (ref 15–50)
IRON SERPL-MCNC: 273 UG/DL (ref 37–145)
LDL CHOLESTEROL: 112 MG/DL
TIBC SERPL-MCNC: 422 UG/DL (ref 260–445)
TRIGL SERPL-MCNC: 143 MG/DL (ref 0–150)
VLDLC SERPL CALC-MCNC: 29 MG/DL

## 2024-06-15 ENCOUNTER — APPOINTMENT (OUTPATIENT)
Dept: PHYSICAL THERAPY | Age: 49
End: 2024-06-15